# Patient Record
(demographics unavailable — no encounter records)

---

## 2024-12-27 NOTE — ASSESSMENT
[FreeTextEntry1] : We discussed orchiectomy and the tumor markers We discussed the surgery risk and benefits We will repeat the ultrasound early January to see if inflammatory vs malignancy We will repeat the tumor markers as well  If he wants to change surgeon Dr Cruz first availability is Jan 8th Booking form entered

## 2024-12-27 NOTE — HISTORY OF PRESENT ILLNESS
[None] : no symptoms [FreeTextEntry1] : 10 days ago had tenderness in his right testicle and saw Dr Alcantara Ultrasound done reveals 2 small lesions  Tumor mrkers negative  Went to [facility] and saw Dr Connelly who scheduled an orchiectomy for Jan 6th Here for a 2nd opinion

## 2025-01-03 NOTE — REASON FOR VISIT
[TextEntry] : Verbal consent given on 01/03/2025 at 11:47 by CAYDEN VERDIN, ~  ~. AUDIO ONLY VISIT UNABLE TO PERFORM AUDIO-VISUAL APPOINTMENT

## 2025-01-03 NOTE — HISTORY OF PRESENT ILLNESS
[FreeTextEntry1] : 25 year old male with right testicular mass and hypogonadism   Pt with two separate right testis masses  Imaging not available  Pending repeat imaging later today  Pending radical orchiectomy with Dr. Cruz next week   Preop testosterone checked: 299 ng/dl --> repeat 220  Estradiol 21. LH 4.9. FSH 8.9. Prolactin 6.7   Pt never tried to have children before No sexual active Able to orgasm/ejaculate

## 2025-01-03 NOTE — ASSESSMENT
[FreeTextEntry1] : 25 year old male with right testicular mass and hypogonadism  Imaging not available. Pending repeat imaging later today  Preop testosterone checked: 299 ng/dl --> repeat 220 Estradiol 21. LH 4.9. FSH 8.9. Prolactin 6.7  -Discussed implications of orchiectomy on testosterone and fertility/sperm parameters -Ultimately many people undergoing unilateral orchiectomy will maintain their fertility postoperatively. Given patient's slightly low testosterone at baseline, very difficult to discern what his preoperative sperm parameters will be, and more importantly what his postop sperm parameters will be -Given his slightly low testosterone preoperatively, also difficult to tell what his postop testosterone production will be like. Difficult to tell what kind of compensatory mechanism his contralateral testicle will have. Recommend repeating testosterone 2 to 3 months after orchiectomy. Briefly discussed options for testosterone replacement if he remains hypogonadal -Discussed sperm cryopreservation preoperatively. Patient and father in agreement on holding off for now. Counseled them that if he ultimately needs chemotherapy, would definitely recommend banking prior to that. They understand -To undergo R orchiectomy next week with Dr. Cruz All questions answered.

## 2025-01-28 NOTE — HISTORY OF PRESENT ILLNESS
[FreeTextEntry1] : pt with Right mixed germ cell tumor. seeing dr GIRARD with Chemo.  no complaints.  Wound healing well. Follow up [facility] oncology. tumor markers back.  follow up as needed.

## 2025-01-28 NOTE — REVIEW OF SYSTEMS
[Diarrhea: Grade 0] : Diarrhea: Grade 0 [Anxiety] : anxiety [Depression] : depression [Negative] : Allergic/Immunologic [Suicidal] : not suicidal [Insomnia] : no insomnia

## 2025-01-28 NOTE — ASSESSMENT
[FreeTextEntry1] : Oli Rodriguez is a 25 years old male with history of ADHD and hypogonadism, newly diagnosed stage Ib nonseminoma with LVI +  I have a lengthy discussion with the patient and his father regarding his testicular cancer and further management. He has stage Ib testicular nonseminoma with LVI+. LVI+ is a high risk 50% recurrence rate. I had a discussion regarding active surveillance, RPLND and one cycle of BEP. One cycle BEP will significantly reduce the risk of recurrence to less than 1% with minimal toxicities. If he is on active surveillance, his cancer recurrence is likely required for 3 to 4 cycles of BEP. If he goes for RPLND, any positive nodes will require chemotherapy. Patient and his father asked many questions and finally agreed to have one cycle of BEP. He was discussed about the sperm banking. They will think about it and get back to us. His mother and maternal grandmother  from breast cancer. He will be referred to the genetic counseling.  Will need to repeat his CT c/a/p w/con. His many questions were answered in full to his satisfaction.

## 2025-01-28 NOTE — HISTORY OF PRESENT ILLNESS
[Disease: _____________________] : Disease: [unfilled] [T: ___] : T[unfilled] [N: ___] : N[unfilled] [M: ___] : M[unfilled] [AJCC Stage: ____] : AJCC Stage: [unfilled] [de-identified] : 25 year old male with ADHD and hypogonadism  Pt p/w right testis masses 12/19/24 CT chest/abdomen/pelvis showed several clustered subcentimeter lymph nodes within the right lower abdominal quadrant, likely reactive, needs a follow up Otherwise, no evidence of metastasis.  1/3/25 US testicles showed 14mm and 7mm masses consistent with neoplasm 1/6/25 underwent right radical orchiectomy with Dr. 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 Otherwise feels fine.  [de-identified] : nonseminoma [de-identified] : normal

## 2025-01-28 NOTE — HISTORY OF PRESENT ILLNESS
[Disease: _____________________] : Disease: [unfilled] [T: ___] : T[unfilled] [N: ___] : N[unfilled] [M: ___] : M[unfilled] [AJCC Stage: ____] : AJCC Stage: [unfilled] [de-identified] : 25 year old male with ADHD and hypogonadism  Pt p/w right testis masses 12/19/24 CT chest/abdomen/pelvis showed several clustered subcentimeter lymph nodes within the right lower abdominal quadrant, likely reactive, needs a follow up Otherwise, no evidence of metastasis.  1/3/25 US testicles showed 14mm and 7mm masses consistent with neoplasm 1/6/25 underwent right radical orchiectomy with Dr. 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 Otherwise feels fine.  [de-identified] : nonseminoma [de-identified] : normal

## 2025-02-26 NOTE — REVIEW OF SYSTEMS
[Feeling Fatigued] : feeling fatigued [Anxiety] : anxiety [Under Stress] : under stress [SOB] : no shortness of breath [Dyspnea on exertion] : not dyspnea during exertion [Chest Discomfort] : no chest discomfort [Palpitations] : no palpitations [Syncope] : no syncope [FreeTextEntry5] : Elevated heart rate during chemotherapy infusions, resolved post-treatment [FreeTextEntry7] : Constipation, managed with docusate [de-identified] : prone to hypodipsia

## 2025-02-26 NOTE — ASSESSMENT
[FreeTextEntry1] : ============================================== Assessment: 1. Elevated heart rate during chemotherapy: ECG showed sinus tachycardia. Likely reactive, in the setting of dehydration, anxiety, increased sympathetic drivers. Less likely arrhythmia/atrial tachycardia. 2. History of dehydration 3. Borderline ECG (incomplete RBBB, borderline LVH by voltage) 4. Risk for cardiovascular disease increased in survivors of testicular/germ cell cancer  Recommendations: 1. Should have an echocardiogram to rule out structural heart issues (can be done after he recovers from his chemotherapy 2. Monitor symptoms; if recurrence or worsening occurs, consider further evaluation with a heart monitor. 3. Ensure adequate hydration and manage anxiety. 4. Discussed importance of vigilance re cardiovascular risk factors including lipids, A1c, blood pressure, and physical activity post-treatment to mitigate risk for later cardiovascular disease (aripiprazole may also affect the cardiometabolic profile)  We discussed his symptoms, the ECG results, and the above recommendations in detail and all questions were answered to the best of my ability and to their apparent satisfaction.

## 2025-02-26 NOTE — HISTORY OF PRESENT ILLNESS
[FreeTextEntry1] : This visit was conducted using real-time two way audio and video technology. The patient, OLI VERDIN, was located at [address] [place] [address] [place] [address] at the time of the visit. The provider, Abiel Stanford MD was located in [facility]. Verbal consent given on 2025 at 14:00 by OLI VERDIN.  Oli, a 25-year-old man, started a BEP chemotherapy regimen on 2025 for stage 1b testicular cancer, which included cisplatin, bleomycin, and etoposide. He experienced an elevated heart rate, reaching 130 bpm during cisplatin infusions, which normalized post-treatment. ECGs showed sinus tachycardia and no arrhythmia and was otherwise unchanged from that recorded pre-chemotherapy. The elevated heart rate was accompanied by slight dizziness and feeling anxious. The patient is under the care of a psychiatrist for anxiety. The patient and his father believe anxiety related to the chemotherapy may have contributed to the symptoms. Oli completed his chemotherapy on 2025 and is scheduled for one more bleomycin dose on 2025.  Past Medical History: - Anxiety and OCD, followed by Dr. Abiel Tang ([facility]) - treated for dehydration in the ER at [place] due to adipsia  Medications: - aripiprazole - escitalopram - docusate  Allergies: - Cinnamon - Nuts  Social History: - Lives in [place] - Recently graduated, studied media/communications - No smoking or alcohol use - Not working currently  Family History: - Hypertension in father and paternal grandmother - Paternal grandfather  of a heart attack in his 70s - Maternal grandmother with hypertension - Maternal grandfather lived into his 90s - No family history of early/premature CAD   Cardiovascular Summary: ---------------------------------------------- EC2025: sinus tachycardia 117 bpm, borderline LVH 2025: sinus rhythm 75 bpm, incomplete RBBB, borderline LVH, normal QTc ---------------------------------------------- Echo: ----------------------------------------------

## 2025-02-26 NOTE — REASON FOR VISIT
[Parent] : parent [FreeTextEntry3] : Dr. Ma [FreeTextEntry1] : ================================================================= CAYDEN VERDIN is a 25 year old man with a history of anxiety disorder seen for elevated heart rate during chemotherapy with BEP regimen for nonseminoma testicular cancer.  Prior Cancer Treatments: ------------------------------------------------------------------------ Chemo/targeted therapy: 2/19/2025: adjuvant bleomycin/etoposide/cisplatin ------------------------------------------------------------------------ Surgery: 1/6/25 right radical orchiectomy ------------------------------------------------------------------------

## 2025-02-26 NOTE — DISCUSSION/SUMMARY
[FreeTextEntry1] : 30 minutes was spent in discussion with the patient during the two way audio-video call and the remaining time was spent on review of the patient's ECGs and medical records and then documenting the encounter and plan of care.

## 2025-03-04 NOTE — ASSESSMENT
[FreeTextEntry1] : Oli Rodriguez is a 25 years old male with history of ADHD and hypogonadism, newly diagnosed stage Ib nonseminoma with LVI +   He has stage Ib testicular nonseminoma with LVI+. LVI+ is a high risk 50% recurrence rate. I had a discussion regarding active surveillance, RPLND and one cycle of BEP. One cycle BEP will significantly reduce the risk of recurrence to less than 1% with minimal toxicities. If he is on active surveillance, his cancer recurrence is likely required for 3 to 4 cycles of BEP. If he goes for RPLND, any positive nodes will require chemotherapy. Patient and his father asked many questions and finally agreed to have one cycle of BEP. He was discussed about the sperm banking. They will think about it and get back to us. His mother and maternal grandmother  from breast cancer. He will be referred to the genetic counseling.  Will need to repeat his CT c/a/p w/con.   Today BEP D15, feels fine neutropenia, advise going ED if he develops fever, hand hygiene and avoiding raw foods. No antibiotic prophylaxis given his count recovery within next 5 days  repeat markers and labs on  repeat scans on  for 0.6cm inter aortocaval node will have follow up with markers every 3 months in the first two years, every 6 months in the year 3 and 4, annual in the year 5. Annual CXR and CT abdomen/pelvis up to two years, clinically indicated thereafter RTC in May

## 2025-03-04 NOTE — REVIEW OF SYSTEMS
[Diarrhea: Grade 0] : Diarrhea: Grade 0 [Depression] : depression [Anxiety] : anxiety [Negative] : Allergic/Immunologic [Fatigue] : fatigue [Constipation] : constipation [Fever] : no fever [Chills] : no chills [Abdominal Pain] : no abdominal pain [Vomiting] : no vomiting [Suicidal] : not suicidal [Insomnia] : no insomnia

## 2025-03-04 NOTE — HISTORY OF PRESENT ILLNESS
[Disease: _____________________] : Disease: [unfilled] [T: ___] : T[unfilled] [N: ___] : N[unfilled] [M: ___] : M[unfilled] [AJCC Stage: ____] : AJCC Stage: [unfilled] [FreeTextEntry1] : pt with Right mixed germ cell tumor. seeing dr GIRARD with Chemo.  no complaints.  Wound healing well. Follow up [facility] oncology. tumor markers back.  follow up as needed. [de-identified] : 25 year old male with ADHD and hypogonadism  Pt p/w right testis masses 12/19/24 CT chest/abdomen/pelvis showed several clustered subcentimeter lymph nodes within the right lower abdominal quadrant, likely reactive, needs a follow up Otherwise, no evidence of metastasis.  1/3/25 US testicles showed 14mm and 7mm masses consistent with neoplasm 1/6/25 underwent right radical orchiectomy with Dr. 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 Otherwise feels fine.  [de-identified] : nonseminoma [de-identified] : normal  [de-identified] : 3/3/25 started having tachycardia and anxiety on C1D2  to D5 and D8. His treatment completed as scheduled.  ON EKG only showed tachycardia and sinus rhythm with incomplete RBBB, borderline LVH.  He was seen by Dr. Stanford who recommended continuing his chemo. Tachycardia was due to reactive to dehydration, anxiety. Will have ECHOCARDIOGRAM to rule out any structural heart disease.  Reports gastric discomfort, fatigue, improved constipation after bowel regimen, sometime bilateral tinnitus. Denies anxiety and tachycardia.

## 2025-03-04 NOTE — HISTORY OF PRESENT ILLNESS
[Disease: _____________________] : Disease: [unfilled] [T: ___] : T[unfilled] [N: ___] : N[unfilled] [M: ___] : M[unfilled] [AJCC Stage: ____] : AJCC Stage: [unfilled] [FreeTextEntry1] : pt with Right mixed germ cell tumor. seeing dr GIRARD with Chemo.  no complaints.  Wound healing well. Follow up [facility] oncology. tumor markers back.  follow up as needed. [de-identified] : 25 year old male with ADHD and hypogonadism  Pt p/w right testis masses 12/19/24 CT chest/abdomen/pelvis showed several clustered subcentimeter lymph nodes within the right lower abdominal quadrant, likely reactive, needs a follow up Otherwise, no evidence of metastasis.  1/3/25 US testicles showed 14mm and 7mm masses consistent with neoplasm 1/6/25 underwent right radical orchiectomy with Dr. 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 Otherwise feels fine.  [de-identified] : nonseminoma [de-identified] : normal  [de-identified] : 3/3/25 started having tachycardia and anxiety on C1D2  to D5 and D8. His treatment completed as scheduled.  ON EKG only showed tachycardia and sinus rhythm with incomplete RBBB, borderline LVH.  He was seen by Dr. Stanford who recommended continuing his chemo. Tachycardia was due to reactive to dehydration, anxiety. Will have ECHOCARDIOGRAM to rule out any structural heart disease.  Reports gastric discomfort, fatigue, improved constipation after bowel regimen, sometime bilateral tinnitus. Denies anxiety and tachycardia.

## 2025-03-04 NOTE — REASON FOR VISIT
details… [Initial Consultation] : an initial consultation [FreeTextEntry2] : stage Ib nonseminoma  Affect and characteristics of appearance, verbalizations, behaviors are appropriate

## 2025-03-04 NOTE — REVIEW OF SYSTEMS
[Diarrhea: Grade 0] : Diarrhea: Grade 0 [Anxiety] : anxiety [Depression] : depression [Negative] : Allergic/Immunologic [Fatigue] : fatigue [Constipation] : constipation [Fever] : no fever [Chills] : no chills [Abdominal Pain] : no abdominal pain [Vomiting] : no vomiting [Suicidal] : not suicidal [Insomnia] : no insomnia

## 2025-03-04 NOTE — HISTORY OF PRESENT ILLNESS
[Disease: _____________________] : Disease: [unfilled] [T: ___] : T[unfilled] [N: ___] : N[unfilled] [M: ___] : M[unfilled] [AJCC Stage: ____] : AJCC Stage: [unfilled] [FreeTextEntry1] : pt with Right mixed germ cell tumor. seeing dr GIRARD with Chemo.  no complaints.  Wound healing well. Follow up [facility] oncology. tumor markers back.  follow up as needed. [de-identified] : 25 year old male with ADHD and hypogonadism  Pt p/w right testis masses 12/19/24 CT chest/abdomen/pelvis showed several clustered subcentimeter lymph nodes within the right lower abdominal quadrant, likely reactive, needs a follow up Otherwise, no evidence of metastasis.  1/3/25 US testicles showed 14mm and 7mm masses consistent with neoplasm 1/6/25 underwent right radical orchiectomy with Dr. 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 Otherwise feels fine.  [de-identified] : nonseminoma [de-identified] : normal  [de-identified] : 3/3/25 started having tachycardia and anxiety on C1D2  to D5 and D8. His treatment completed as scheduled.  ON EKG only showed tachycardia and sinus rhythm with incomplete RBBB, borderline LVH.  He was seen by Dr. Stanford who recommended continuing his chemo. Tachycardia was due to reactive to dehydration, anxiety. Will have ECHOCARDIOGRAM to rule out any structural heart disease.  Reports gastric discomfort, fatigue, improved constipation after bowel regimen, sometime bilateral tinnitus. Denies anxiety and tachycardia.

## 2025-03-04 NOTE — HISTORY OF PRESENT ILLNESS
[Disease: _____________________] : Disease: [unfilled] [T: ___] : T[unfilled] [N: ___] : N[unfilled] [M: ___] : M[unfilled] [AJCC Stage: ____] : AJCC Stage: [unfilled] [FreeTextEntry1] : pt with Right mixed germ cell tumor. seeing dr GIRARD with Chemo.  no complaints.  Wound healing well. Follow up [facility] oncology. tumor markers back.  follow up as needed. [de-identified] : 25 year old male with ADHD and hypogonadism  Pt p/w right testis masses 12/19/24 CT chest/abdomen/pelvis showed several clustered subcentimeter lymph nodes within the right lower abdominal quadrant, likely reactive, needs a follow up Otherwise, no evidence of metastasis.  1/3/25 US testicles showed 14mm and 7mm masses consistent with neoplasm 1/6/25 underwent right radical orchiectomy with Dr. 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 Otherwise feels fine.  [de-identified] : nonseminoma [de-identified] : normal  [de-identified] : 3/3/25 started having tachycardia and anxiety on C1D2  to D5 and D8. His treatment completed as scheduled.  ON EKG only showed tachycardia and sinus rhythm with incomplete RBBB, borderline LVH.  He was seen by Dr. Stanford who recommended continuing his chemo. Tachycardia was due to reactive to dehydration, anxiety. Will have ECHOCARDIOGRAM to rule out any structural heart disease.  Reports gastric discomfort, fatigue, improved constipation after bowel regimen, sometime bilateral tinnitus. Denies anxiety and tachycardia.

## 2025-05-16 NOTE — ASSESSMENT
[FreeTextEntry1] : Oli Rodriguez is a 25 years old male with history of ADHD and hypogonadism, newly diagnosed stage Ib nonseminoma with LVI +   He has stage Ib testicular nonseminoma with LVI+. LVI+ is a high risk 50% recurrence rate. I had a discussion regarding active surveillance, RPLND and one cycle of BEP. One cycle BEP will significantly reduce the risk of recurrence to less than 1% with minimal toxicities. If he is on active surveillance, his cancer recurrence is likely required for 3 to 4 cycles of BEP. If he goes for RPLND, any positive nodes will require chemotherapy. Patient and his father asked many questions and finally agreed to have one cycle of BEP. He was discussed about the sperm banking. They will think about it and get back to us. His mother and maternal grandmother  from breast cancer. He will be referred to the genetic counseling.  Will need to repeat his CT c/a/p w/con.   s/p 1 cycle of BEP repeat markers and labs on May 16 repeat scans on  for 0.6cm inter aortocaval node review CT chest/abdomen/pelvis on 25 with the patient and his father 25 CT CAP showed No acute pathology. No lymphadenopathy or evidence of new metastatic lesion. will have follow up with markers every 3 months in the first two years, every 6 months in the year 3 and 4, annual in the year 5. Annual CXR and CT abdomen/pelvis up to two years, clinically indicated thereafter RTC in August

## 2025-05-16 NOTE — REVIEW OF SYSTEMS
[Fatigue] : fatigue [Constipation] : constipation [Diarrhea: Grade 0] : Diarrhea: Grade 0 [Anxiety] : anxiety [Depression] : depression [Negative] : Allergic/Immunologic [Fever] : no fever [Chills] : no chills [Abdominal Pain] : no abdominal pain [Vomiting] : no vomiting [Suicidal] : not suicidal [Insomnia] : no insomnia

## 2025-05-16 NOTE — HISTORY OF PRESENT ILLNESS
[Disease: _____________________] : Disease: [unfilled] [T: ___] : T[unfilled] [N: ___] : N[unfilled] [M: ___] : M[unfilled] [AJCC Stage: ____] : AJCC Stage: [unfilled] [FreeTextEntry1] : pt with Right mixed germ cell tumor. seeing dr GIRARD with Chemo.  no complaints.  Wound healing well. Follow up [facility] oncology. tumor markers back.  follow up as needed. [de-identified] : nonseminoma [de-identified] : 25 year old male with ADHD and hypogonadism  Pt p/w right testis masses 12/19/24 CT chest/abdomen/pelvis showed several clustered subcentimeter lymph nodes within the right lower abdominal quadrant, likely reactive, needs a follow up Otherwise, no evidence of metastasis.  1/3/25 US testicles showed 14mm and 7mm masses consistent with neoplasm 1/6/25 underwent right radical orchiectomy with Dr. 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 Otherwise feels fine.  [de-identified] : normal  [de-identified] : 3/3/25 started having tachycardia and anxiety on C1D2  to D5 and D8. His treatment completed as scheduled.  ON EKG only showed tachycardia and sinus rhythm with incomplete RBBB, borderline LVH.  He was seen by Dr. Stanford who recommended continuing his chemo. Tachycardia was due to reactive to dehydration, anxiety. Will have ECHOCARDIOGRAM to rule out any structural heart disease.  Reports gastric discomfort, fatigue, improved constipation after bowel regimen, sometime bilateral tinnitus. Denies anxiety and tachycardia.   5/16/25 feels overall fine. Denies fatigue, nausea, vomiting and diarrhea and constipation.

## 2025-05-16 NOTE — HISTORY OF PRESENT ILLNESS
[Disease: _____________________] : Disease: [unfilled] [T: ___] : T[unfilled] [N: ___] : N[unfilled] [M: ___] : M[unfilled] [AJCC Stage: ____] : AJCC Stage: [unfilled] [FreeTextEntry1] : pt with Right mixed germ cell tumor. seeing dr GIRARD with Chemo.  no complaints.  Wound healing well. Follow up Health system oncology. tumor markers back.  follow up as needed. [de-identified] : nonseminoma [de-identified] : 25 year old male with ADHD and hypogonadism  Pt p/w right testis masses 12/19/24 CT chest/abdomen/pelvis showed several clustered subcentimeter lymph nodes within the right lower abdominal quadrant, likely reactive, needs a follow up Otherwise, no evidence of metastasis.  1/3/25 US testicles showed 14mm and 7mm masses consistent with neoplasm 1/6/25 underwent right radical orchiectomy with Dr. 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 Otherwise feels fine.  [de-identified] : normal  [de-identified] : 3/3/25 started having tachycardia and anxiety on C1D2  to D5 and D8. His treatment completed as scheduled.  ON EKG only showed tachycardia and sinus rhythm with incomplete RBBB, borderline LVH.  He was seen by Dr. Stanford who recommended continuing his chemo. Tachycardia was due to reactive to dehydration, anxiety. Will have ECHOCARDIOGRAM to rule out any structural heart disease.  Reports gastric discomfort, fatigue, improved constipation after bowel regimen, sometime bilateral tinnitus. Denies anxiety and tachycardia.   5/16/25 feels overall fine. Denies fatigue, nausea, vomiting and diarrhea and constipation.